# Patient Record
Sex: MALE | Race: WHITE | Employment: FULL TIME | URBAN - METROPOLITAN AREA
[De-identification: names, ages, dates, MRNs, and addresses within clinical notes are randomized per-mention and may not be internally consistent; named-entity substitution may affect disease eponyms.]

---

## 2017-09-25 ENCOUNTER — HOSPITAL ENCOUNTER (EMERGENCY)
Age: 47
Discharge: HOME | End: 2017-09-25
Attending: EMERGENCY MEDICINE
Payer: SELF-PAY

## 2017-09-25 VITALS
BODY MASS INDEX: 27.68 KG/M2 | WEIGHT: 176.37 LBS | DIASTOLIC BLOOD PRESSURE: 68 MMHG | RESPIRATION RATE: 16 BRPM | OXYGEN SATURATION: 100 % | HEART RATE: 68 BPM | SYSTOLIC BLOOD PRESSURE: 132 MMHG | TEMPERATURE: 98 F | HEIGHT: 67 IN

## 2017-09-25 DIAGNOSIS — B02.9 HERPES ZOSTER WITHOUT COMPLICATION: Primary | ICD-10-CM

## 2017-09-25 PROCEDURE — 99283 EMERGENCY DEPT VISIT LOW MDM: CPT | Performed by: EMERGENCY MEDICINE

## 2017-09-25 RX ORDER — VALACYCLOVIR HYDROCHLORIDE 1 G/1
1000 TABLET, FILM COATED ORAL 3 TIMES DAILY
Qty: 21 TAB | Refills: 0 | Status: SHIPPED | OUTPATIENT
Start: 2017-09-25 | End: 2017-09-25

## 2017-09-25 RX ORDER — ACYCLOVIR 800 MG/1
800 TABLET ORAL
Qty: 35 TAB | Refills: 0 | Status: SHIPPED | OUTPATIENT
Start: 2017-09-25 | End: 2017-10-02

## 2017-09-25 RX ORDER — PREDNISONE 20 MG/1
40 TABLET ORAL DAILY
Qty: 8 TAB | Refills: 0 | Status: SHIPPED | OUTPATIENT
Start: 2017-09-25 | End: 2017-09-29

## 2017-09-25 NOTE — ED TRIAGE NOTES
Pt states rash on left leg that he noticed yesterday. States some pain in the thigh and hip for a few days.

## 2017-09-25 NOTE — ED PROVIDER NOTES
CDNotes Templates                         Emergency Department     Chief Complaint:  Painful rash on the left leg  HPI:  44-year-old male presents to the emergency department with pain in the left leg and a rash. Patient states he has pain in his foot and heel as well as up his leg. Noticed a red rash after a few days. Hurts to walk. No alleviating. The like he had been bitten by some bugs. .    Patients symptoms started several days ago  Patients symptoms include: rash  Severity of symptoms is described as moderate  Associated symptoms include none  Historian:   patient  Review of Systems:  Include pertinent positives and negatives. CONST:  No fever no weakness  RESP:  Denies: cough, shortness of breath  MUSC: Denies: muscle aches  SKIN:  Per HPI  NEURO: Denies: headache, extremity weakness  Past Medical History:  Past Medical History:   Diagnosis Date    Liver disease     Gilbaerty syndrome     History reviewed. No pertinent surgical history. Social History   Substance Use Topics    Smoking status: Former Smoker    Smokeless tobacco: None    Alcohol use None     History reviewed. No pertinent family history. Previous Medications    No medications on file     Allergies as of 09/25/2017    (No Known Allergies)       Physical Exam:    Vital signs:   Visit Vitals    /67    Pulse 73    Temp 98.1 °F (36.7 °C)    Resp 16    Ht 5' 7\" (1.702 m)    Wt 80 kg (176 lb 5.9 oz)    SpO2 100%    BMI 27.62 kg/m2       Vital signs were reviewed. General Appear: Well-appearing nontoxic male  Cardiovascular: Pulses 2+  Respiratory:  Respirations easy and nonlabored  Musculoskeletal: no edema  Skin:   Vesicular rash running down the back of the left leg on the heel  _______________________________________________________________________  ______________________________________________________________________  Assessment/Plan:  Suspect the patient has shingles.   We'll treat for same.  _______________________________________________________________________  Condition:  good  Disposition:  home  Diagnosis:  DORIS Rubin; version 2.0; revised April, 2016

## 2017-09-25 NOTE — DISCHARGE INSTRUCTIONS
